# Patient Record
(demographics unavailable — no encounter records)

---

## 2024-10-28 NOTE — HISTORY OF PRESENT ILLNESS
[FreeTextEntry1] : 62 yo pt here for annual exam.  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 had hives  on it seeing endocrine for thyroid . trying gluten free